# Patient Record
Sex: MALE | Race: WHITE | Employment: FULL TIME | URBAN - METROPOLITAN AREA
[De-identification: names, ages, dates, MRNs, and addresses within clinical notes are randomized per-mention and may not be internally consistent; named-entity substitution may affect disease eponyms.]

---

## 2019-02-27 ENCOUNTER — APPOINTMENT (OUTPATIENT)
Dept: CT IMAGING | Facility: CLINIC | Age: 29
End: 2019-02-27
Attending: PHYSICIAN ASSISTANT
Payer: MEDICAID

## 2019-02-27 ENCOUNTER — HOSPITAL ENCOUNTER (EMERGENCY)
Facility: CLINIC | Age: 29
Discharge: HOME | End: 2019-02-27
Attending: PHYSICIAN ASSISTANT | Admitting: PHYSICIAN ASSISTANT
Payer: MEDICAID

## 2019-02-27 VITALS
WEIGHT: 222 LBS | OXYGEN SATURATION: 99 % | HEART RATE: 60 BPM | RESPIRATION RATE: 16 BRPM | DIASTOLIC BLOOD PRESSURE: 60 MMHG | TEMPERATURE: 98.7 F | SYSTOLIC BLOOD PRESSURE: 121 MMHG

## 2019-02-27 DIAGNOSIS — R10.32 ABDOMINAL PAIN, LEFT LOWER QUADRANT: ICD-10-CM

## 2019-02-27 DIAGNOSIS — R19.7 DIARRHEA, UNSPECIFIED TYPE: ICD-10-CM

## 2019-02-27 LAB
ALBUMIN SERPL-MCNC: 4 G/DL (ref 3.4–5)
ALBUMIN UR-MCNC: NEGATIVE MG/DL
ALP SERPL-CCNC: 78 U/L (ref 40–150)
ALT SERPL W P-5'-P-CCNC: 30 U/L (ref 0–70)
ANION GAP SERPL CALCULATED.3IONS-SCNC: 8 MMOL/L (ref 3–14)
APPEARANCE UR: CLEAR
AST SERPL W P-5'-P-CCNC: 20 U/L (ref 0–45)
BASOPHILS # BLD AUTO: 0 10E9/L (ref 0–0.2)
BASOPHILS NFR BLD AUTO: 0.2 %
BILIRUB SERPL-MCNC: 0.7 MG/DL (ref 0.2–1.3)
BILIRUB UR QL STRIP: NEGATIVE
BUN SERPL-MCNC: 14 MG/DL (ref 7–30)
CALCIUM SERPL-MCNC: 8.3 MG/DL (ref 8.5–10.1)
CHLORIDE SERPL-SCNC: 108 MMOL/L (ref 94–109)
CO2 SERPL-SCNC: 22 MMOL/L (ref 20–32)
COLOR UR AUTO: YELLOW
CREAT SERPL-MCNC: 0.94 MG/DL (ref 0.66–1.25)
DIFFERENTIAL METHOD BLD: ABNORMAL
EOSINOPHIL # BLD AUTO: 0.3 10E9/L (ref 0–0.7)
EOSINOPHIL NFR BLD AUTO: 2.4 %
ERYTHROCYTE [DISTWIDTH] IN BLOOD BY AUTOMATED COUNT: 12.3 % (ref 10–15)
GFR SERPL CREATININE-BSD FRML MDRD: >90 ML/MIN/{1.73_M2}
GLUCOSE SERPL-MCNC: 95 MG/DL (ref 70–99)
GLUCOSE UR STRIP-MCNC: NEGATIVE MG/DL
HCT VFR BLD AUTO: 51.1 % (ref 40–53)
HGB BLD-MCNC: 18.2 G/DL (ref 13.3–17.7)
HGB UR QL STRIP: NEGATIVE
IMM GRANULOCYTES # BLD: 0 10E9/L (ref 0–0.4)
IMM GRANULOCYTES NFR BLD: 0.3 %
KETONES UR STRIP-MCNC: 5 MG/DL
LEUKOCYTE ESTERASE UR QL STRIP: NEGATIVE
LIPASE SERPL-CCNC: 81 U/L (ref 73–393)
LYMPHOCYTES # BLD AUTO: 1.4 10E9/L (ref 0.8–5.3)
LYMPHOCYTES NFR BLD AUTO: 13.4 %
MCH RBC QN AUTO: 30.1 PG (ref 26.5–33)
MCHC RBC AUTO-ENTMCNC: 35.6 G/DL (ref 31.5–36.5)
MCV RBC AUTO: 85 FL (ref 78–100)
MONOCYTES # BLD AUTO: 0.5 10E9/L (ref 0–1.3)
MONOCYTES NFR BLD AUTO: 4.6 %
MUCOUS THREADS #/AREA URNS LPF: PRESENT /LPF
NEUTROPHILS # BLD AUTO: 8.3 10E9/L (ref 1.6–8.3)
NEUTROPHILS NFR BLD AUTO: 79.1 %
NITRATE UR QL: NEGATIVE
NRBC # BLD AUTO: 0 10*3/UL
NRBC BLD AUTO-RTO: 0 /100
PH UR STRIP: 5 PH (ref 5–7)
PLATELET # BLD AUTO: 207 10E9/L (ref 150–450)
POTASSIUM SERPL-SCNC: 3.8 MMOL/L (ref 3.4–5.3)
PROT SERPL-MCNC: 7.5 G/DL (ref 6.8–8.8)
RBC # BLD AUTO: 6.05 10E12/L (ref 4.4–5.9)
RBC #/AREA URNS AUTO: <1 /HPF (ref 0–2)
SODIUM SERPL-SCNC: 138 MMOL/L (ref 133–144)
SOURCE: ABNORMAL
SP GR UR STRIP: 1.03 (ref 1–1.03)
UROBILINOGEN UR STRIP-MCNC: 0 MG/DL (ref 0–2)
WBC # BLD AUTO: 10.5 10E9/L (ref 4–11)
WBC #/AREA URNS AUTO: 1 /HPF (ref 0–5)

## 2019-02-27 PROCEDURE — 99285 EMERGENCY DEPT VISIT HI MDM: CPT | Mod: 25

## 2019-02-27 PROCEDURE — 83690 ASSAY OF LIPASE: CPT | Performed by: PHYSICIAN ASSISTANT

## 2019-02-27 PROCEDURE — 96361 HYDRATE IV INFUSION ADD-ON: CPT

## 2019-02-27 PROCEDURE — 74177 CT ABD & PELVIS W/CONTRAST: CPT

## 2019-02-27 PROCEDURE — 85025 COMPLETE CBC W/AUTO DIFF WBC: CPT | Performed by: PHYSICIAN ASSISTANT

## 2019-02-27 PROCEDURE — 80053 COMPREHEN METABOLIC PANEL: CPT | Performed by: PHYSICIAN ASSISTANT

## 2019-02-27 PROCEDURE — 96376 TX/PRO/DX INJ SAME DRUG ADON: CPT

## 2019-02-27 PROCEDURE — 25000128 H RX IP 250 OP 636: Performed by: PHYSICIAN ASSISTANT

## 2019-02-27 PROCEDURE — 96374 THER/PROPH/DIAG INJ IV PUSH: CPT | Mod: 59

## 2019-02-27 PROCEDURE — 81001 URINALYSIS AUTO W/SCOPE: CPT | Performed by: PHYSICIAN ASSISTANT

## 2019-02-27 RX ORDER — HYDROMORPHONE HYDROCHLORIDE 1 MG/ML
0.5 INJECTION, SOLUTION INTRAMUSCULAR; INTRAVENOUS; SUBCUTANEOUS ONCE
Status: COMPLETED | OUTPATIENT
Start: 2019-02-27 | End: 2019-02-27

## 2019-02-27 RX ORDER — IOPAMIDOL 755 MG/ML
500 INJECTION, SOLUTION INTRAVASCULAR ONCE
Status: COMPLETED | OUTPATIENT
Start: 2019-02-27 | End: 2019-02-27

## 2019-02-27 RX ADMIN — SODIUM CHLORIDE 1000 ML: 9 INJECTION, SOLUTION INTRAVENOUS at 10:33

## 2019-02-27 RX ADMIN — Medication 0.5 MG: at 10:40

## 2019-02-27 RX ADMIN — Medication 0.5 MG: at 12:00

## 2019-02-27 RX ADMIN — IOPAMIDOL 100 ML: 755 INJECTION, SOLUTION INTRAVENOUS at 11:47

## 2019-02-27 RX ADMIN — SODIUM CHLORIDE 65 ML: 9 INJECTION, SOLUTION INTRAVENOUS at 11:47

## 2019-02-27 ASSESSMENT — ENCOUNTER SYMPTOMS
BLOOD IN STOOL: 0
HEMATURIA: 0
DIARRHEA: 1
DYSURIA: 0
VOMITING: 1
NAUSEA: 1
FEVER: 0
ABDOMINAL PAIN: 1

## 2019-02-27 NOTE — ED TRIAGE NOTES
ABCs intact. Pt c/o abdominal pain, n/v/d since Sunday. Pt has been taking pepto bismol. Denies urinary symptoms.

## 2019-02-27 NOTE — ED PROVIDER NOTES
"  History     Chief Complaint:  Abdominal Pain      HPI   Sivakumar Jason is a 28 year old male who presents to the ED for evaluation of abdominal pain. The patient reports that he has been experiencing persistent epigastric discomfort radiating to his LLQ for the past three days. The patient describes this as a cramping pain \"like someone is twisting my stomach\" that is worse in the morning and improves over the course of the day. He has also been experiencing associated nausea, one episode of emesis, and black stools secondary to the Pepto Bismol.  Denies bloody stools. He has had no recent sick exposures and denies experiencing this before.  diarrhea that is preceded by severe abdominal pain. The patient has been taking Pepto Bismol with some improvement in his discomfort, although he presented to the ED today secondary to worsening symptoms. Here in the ED, the patient states that his LLQ pain has been most severe, although it has since lessened. He has been able to tolerate PO, and he denies any fevers, dysuria, hematuria, or other urinary symptoms.    Allergies:  Sulfa Drugs     Medications:    Albuterol    Past Medical History:    Asthma  Opioid overdose    Past Surgical History:    The patient does not have any pertinent past surgical history including abdominal surgery.    Family History:    No past pertinent family history.    Social History:  Smoking Status: Current every day smoker  Alcohol Use: Yes   Marital Status:  Single   Drug Use: History of opiate abuse    Review of Systems   Constitutional: Negative for fever.   Gastrointestinal: Positive for abdominal pain, diarrhea, nausea and vomiting. Negative for blood in stool.   Genitourinary: Negative for dysuria and hematuria.   All other systems reviewed and are negative.      Physical Exam     Patient Vitals for the past 24 hrs:   BP Temp Temp src Pulse Resp SpO2 Weight   02/27/19 1135 -- -- -- -- -- -- 100.7 kg (222 lb 0.1 oz)   02/27/19 1006 144/79 " 98.7  F (37.1  C) Temporal 83 18 96 % --       Physical Exam  Constitutional: well appearing, no acute distress.  Head: No external signs of trauma noted to head or face.   Eyes: Pupils are equal, round, and reactive to light. Conjunctiva normal. No scleral icterus.   ENT: MMM. Normal voice.   Neck: Normal ROM.   Cardiovascular: Normal rate, regular rhythm  Respiratory: Effort normal. No respiratory distress. Lungs clear to auscultation bilaterally.   GI: Soft. LLQ tenderness to palpation without rebound or guarding.   Musculoskeletal: No deformities appreciated. Normal ROM. No edema noted.  Neurological: Alert and Oriented x 3. Speech normal. Moves all extremities equally.  Psychiatric: Appropriate mood, affect, and behavior.   Skin: Skin is warm and dry. No jaundice        Emergency Department Course     Imaging:  Radiographic findings were communicated with the patient who voiced understanding of the findings.    CT Abdomen/Pelvis with IV contrast:   1. Diffuse bowel wall thickening and associated inflammatory stranding  throughout the colon and involving several loops of terminal ileum.  Findings are consistent with a pancolitis and terminal ileitis, most  likely infectious or inflammatory in etiology.  2. There is a small amount of associated free fluid about the liver,  within the right paracolic gutter, and within the pelvis.  3. Mild splenomegaly.  As per radiology.    Laboratory:  CBC: WBC: 10.5, HGB: 18.2 (H), PLT: 207  CMP: Calcium 8.3 (L), o/w WNL (Creatinine: 0.94)    Lipase: 81    UA with Microscopic: Ketones 5 (A), Mucous present (A), o/w WNL     Interventions:  1033 NS 1L IV   1040 Dilaudid 0.5 mg IV  1200 Dilaudid 0.5 mg IV    Emergency Department Course:  Nursing notes and vitals reviewed. (3788) I performed an exam of the patient as documented above.     IV inserted. Medicine administered as documented above. Blood drawn. This was sent to the lab for further testing, results above.    The patient  provided a urine sample here in the emergency department. This was sent for laboratory testing, findings above.      (1122) I rechecked the patient and discussed the results of his workup thus far.     The patient was sent for an abdominal/pelvic CT while in the emergency department, findings above.     Findings and plan explained to the Patient. Patient discharged home with instructions regarding supportive care, medications, and reasons to return. The importance of close follow-up was reviewed.     I personally reviewed the laboratory results with the Patient and answered all related questions prior to discharge.       Impression & Plan      Medical Decision Makin year old male presenting with diarrhea and LLQ abdominal pain. Differential diagnosis includes viral gastroenteritis, C diff, UC, crohn's, IBS, diverticulitis, among others. He is afebrile and well appearing on arrival, but does have focal LLQ tenderness on exam. Labs are unremarkable, including no leukocytosis. Urinalysis does not reveal any evidence of infection. There are no RBCs on urinalysis either, making urinary tract stone less likely. Given his focal LLQ tenderness, CT was obtained. This showed diffuse bowel thickening through colon and ileum, likely infectious or inflammatory in etiology. On reassessment, his pain has improved and his abdominal exam is benign. I suspect these findings are most likely related to a viral gastroenteritis. I did discuss with him other possibilities such as UC or Crohn's, but feel these are less likely at this time. He is afebrile without leukocytosis so I do not think there is any indication for antibiotics at this time. He is tolerating PO here and pain has improved so I think he is appropriate for discharge home at this time. He was instructed to follow-up with his PCP in 1-2 days for reassessment. Instructed to return to the ED for any new or worsening symptoms, including increased pain, fever, further  vomiting, or other concerning symptoms.     Critical Care time:  none    Diagnosis:    ICD-10-CM    1. Abdominal pain, left lower quadrant R10.32    2. Diarrhea, unspecified type R19.7        Disposition:  discharged to home      Scribe Disclosure:  I, Beata Zargaoza, am serving as a scribe on 2/27/2019 at 10:20 AM to personally document services performed by Analy Beckwith PA-C;L* based on my observations and the provider's statements to me.      Beata Zaragoza  2/27/2019   [facility] EMERGENCY DEPARTMENT       Analy Beckwith PA-C  02/27/19 1252

## 2019-02-27 NOTE — ED AVS SNAPSHOT
[facility] Emergency Department  [address]  [facility] [address]  Phone:  [phone]  Fax:  [phone]                                    Sivakumar Jason   MRN: [id number]    Department:  [facility] Emergency Department   Date of Visit:  2/27/2019           After Visit Summary Signature Page    I have received my discharge instructions, and my questions have been answered. I have discussed any challenges I see with this plan with the nurse or doctor.    ..........................................................................................................................................  Patient/Patient Representative Signature      ..........................................................................................................................................  Patient Representative Print Name and Relationship to Patient    ..................................................               ................................................  Date                                   Time    ..........................................................................................................................................  Reviewed by Signature/Title    ...................................................              ..............................................  Date                                               Time          22EPIC Rev 08/18